# Patient Record
Sex: FEMALE | Race: WHITE | NOT HISPANIC OR LATINO | Employment: PART TIME | URBAN - NONMETROPOLITAN AREA
[De-identification: names, ages, dates, MRNs, and addresses within clinical notes are randomized per-mention and may not be internally consistent; named-entity substitution may affect disease eponyms.]

---
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
## 2024-05-27 ENCOUNTER — APPOINTMENT (OUTPATIENT)
Dept: RADIOLOGY | Facility: MEDICAL CENTER | Age: 19
End: 2024-05-27
Payer: COMMERCIAL

## 2024-05-27 ENCOUNTER — TELEPHONE (OUTPATIENT)
Dept: URGENT CARE | Facility: MEDICAL CENTER | Age: 19
End: 2024-05-27

## 2024-05-27 ENCOUNTER — OFFICE VISIT (OUTPATIENT)
Dept: URGENT CARE | Facility: MEDICAL CENTER | Age: 19
End: 2024-05-27
Payer: COMMERCIAL

## 2024-05-27 VITALS
OXYGEN SATURATION: 99 % | RESPIRATION RATE: 18 BRPM | HEART RATE: 101 BPM | WEIGHT: 140 LBS | HEIGHT: 67 IN | TEMPERATURE: 98.7 F | BODY MASS INDEX: 21.97 KG/M2

## 2024-05-27 DIAGNOSIS — S82.302A FRACTURE OF DISTAL END OF TIBIA WITH FIBULA, LEFT, CLOSED, INITIAL ENCOUNTER: Primary | ICD-10-CM
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
## 2024-05-27 DIAGNOSIS — S82.832A FRACTURE OF DISTAL END OF TIBIA WITH FIBULA, LEFT, CLOSED, INITIAL ENCOUNTER: Primary | ICD-10-CM

## 2024-05-27 DIAGNOSIS — S62.309A CLOSED FRACTURE OF HEAD OF METACARPAL: ICD-10-CM

## 2024-05-27 PROCEDURE — 29130 APPL FINGER SPLINT STATIC: CPT | Performed by: STUDENT IN AN ORGANIZED HEALTH CARE EDUCATION/TRAINING PROGRAM

## 2024-05-27 PROCEDURE — 73610 X-RAY EXAM OF ANKLE: CPT

## 2024-05-27 PROCEDURE — G0382 LEV 3 HOSP TYPE B ED VISIT: HCPCS | Performed by: STUDENT IN AN ORGANIZED HEALTH CARE EDUCATION/TRAINING PROGRAM

## 2024-05-27 PROCEDURE — 73110 X-RAY EXAM OF WRIST: CPT

## 2024-05-27 PROCEDURE — 73130 X-RAY EXAM OF HAND: CPT

## 2024-05-27 PROCEDURE — 73630 X-RAY EXAM OF FOOT: CPT

## 2024-05-27 NOTE — LETTER
May 27, 2024     Patient: Cyndi Nath   YOB: 2005   Date of Visit: 5/27/2024       To Whom It May Concern:    It is my medical opinion that Cyndi Nath may return to light duty immediately with the following restrictions: non weight bearing on the left lower leg and avoid use of left hand . Restrictions in place for 2 weeks maximum or till seen by specialist, whichever is earlier.    If you have any questions or concerns, please don't hesitate to call.         Sincerely,        Barbara Gatica,     CC: No Recipients

## 2024-05-27 NOTE — PROGRESS NOTES
[facility]        NAME: Cyndi Nath is a 19 y.o. female  : 2005    MRN: [id number]    Assessment and Plan   Fracture of distal end of tibia with fibula, left, closed, initial encounter [S82.302A, S82.832A]  1. Fracture of distal end of tibia with fibula, left, closed, initial encounter  XR foot 3+ vw left    XR ankle 3+ vw left    Ambulatory Referral to Orthopedic Surgery    Orthopedic injury treatment      2. Closed fracture of head of metacarpal  XR hand 3+ vw left    XR wrist 3+ vw left    Ambulatory Referral to Orthopedic Surgery    Orthopedic injury treatment        Foot and ankle x-ray-possible fracture of the lateral malleolus along the posterior aspect, final radiology read pending.  Hand and wrist x-ray-2 bone fragments noted over the third and fourth metacarpal heads, likely avulsion, final radiology read pending.  Placed splints for both suspected ankle and hand fractures.  Provided crutches for nonweightbearing of left lower extremity.  Recommend RICE and NSAIDs.  Orthopedic consult scheduled.      Patient Instructions     See wrap up for details  Proceed to  ER if symptoms worsen.    Chief Complaint     Chief Complaint   Patient presents with    Ankle Pain     Left ankle pain after falling when playing basket ball last night     Hand Pain     Left hand a wrist pain that started last night          History of Present Illness     Ankle Pain     Hand Pain   Pertinent negatives include no chest pain.     P/w injury to the left hand and left ankle.  Was playing basketball yesterday when she twisted her ankle inwards and fell down and broke her fall with her left arm with the elbow flexed in neutral position and fell on the dorsal aspect of the left hand.  Reports there was some pain in the left wrist initially but that has since resolved, at this time only has pain over the third and fourth knuckles with some swelling.  Has been using cool compress with some relief.  Reports some  limitation range of motion.  Reports swelling and pain over the lateral ankle, has been able to weight-bear.    Review of Systems   Review of Systems   Constitutional:  Negative for chills and fever.   HENT:  Negative for ear pain and sore throat.    Eyes:  Negative for pain and visual disturbance.   Respiratory:  Negative for cough, chest tightness and shortness of breath.    Cardiovascular:  Negative for chest pain and palpitations.   Gastrointestinal:  Negative for abdominal pain, constipation, diarrhea, nausea and vomiting.   Genitourinary:  Negative for dysuria, hematuria and menstrual problem.   Musculoskeletal:  Positive for arthralgias and joint swelling. Negative for back pain.   Skin:  Negative for color change and rash.   Neurological:  Negative for seizures and syncope.   Psychiatric/Behavioral:  Negative for dysphoric mood and suicidal ideas.    All other systems reviewed and are negative.    Current Medications       Current Outpatient Medications:     ibuprofen (MOTRIN) 400 mg tablet, Take by mouth every 6 (six) hours as needed for mild pain, Disp: , Rfl:     Lessina 0.1-20 MG-MCG per tablet, take 1 tablet by mouth once daily for FIRST DAY OF NEXT MENSES, Disp: , Rfl:     sodium chloride (OCEAN) 0.65 % nasal spray, 1 spray into each nostril as needed for rhinitis (Patient not taking: Reported on 8/24/2023), Disp: 30 mL, Rfl: 0    Current Allergies     Allergies as of 05/27/2024 - Reviewed 05/27/2024   Allergen Reaction Noted    Adhesive [medical tape] Rash 09/14/2018    Caffeine - food allergy Palpitations 09/14/2018    Ceftin [cefuroxime] Palpitations 09/14/2018    Penicillins Rash 09/14/2018            The following portions of the patient's history were reviewed and updated as appropriate: allergies, current medications, past family history, past medical history, past social history, past surgical history and problem list.     Past Medical History:   Diagnosis Date    Chronic tonsillitis      "COVID-19     2021    Enlarged tonsils     Heart valve disorder     Pt has a shortened OH interval per pt    Hyperlipidemia     Tonsil stone        Past Surgical History:   Procedure Laterality Date    MOUTH SURGERY      TONSILECTOMY AND ADNOIDECTOMY N/A 6/8/2023    Procedure: TONSILLECTOMY & ADENOIDECTOMY;  Surgeon: David Abbasi MD;  Location: [facility] OR;  Service: ENT       Family History   Problem Relation Age of Onset    Cancer Mother     Hypertension Father     Hyperlipidemia Father          Medications have been verified.        Objective   Pulse 101   Temp 98.7 °F (37.1 °C)   Resp 18   Ht 5' 7\" (1.702 m)   Wt 63.5 kg (140 lb)   SpO2 99%   BMI 21.93 kg/m²        Physical Exam     Physical Exam  Constitutional:       General: She is not in acute distress.     Appearance: Normal appearance.   HENT:      Head: Normocephalic and atraumatic.   Eyes:      Extraocular Movements: Extraocular movements intact.      Conjunctiva/sclera: Conjunctivae normal.   Cardiovascular:      Rate and Rhythm: Normal rate.   Pulmonary:      Effort: Pulmonary effort is normal. No respiratory distress.   Musculoskeletal:      Left hand: Swelling, tenderness and bony tenderness present. No deformity or lacerations. Decreased range of motion. Decreased strength. Normal sensation. There is no disruption of two-point discrimination. Normal capillary refill. Normal pulse.        Hands:       Left ankle: Swelling present. No deformity, ecchymosis or lacerations. Tenderness present over the lateral malleolus. No medial malleolus, ATF ligament, AITF ligament, CF ligament, posterior TF ligament, base of 5th metatarsal or proximal fibula tenderness. Decreased range of motion. Anterior drawer test negative. Normal pulse.      Left Achilles Tendon: Normal.      Left foot: Normal range of motion and normal capillary refill. Laceration present. No swelling, deformity, bunion, foot drop, tenderness or bony tenderness. Normal pulse.      " Comments: Left hand tenderness to palpation over the distal portions of the third and fourth metacarpal bones along the dorsal aspect with mild associated swelling.  Limitation in  strength testing as patient unable to form a tight fist due to reproduction of pain.  Also reproduction of pain noted with resisted flexion and extension of the fourth and fifth digits.    Left ankle and foot-tenderness to palpation of the lateral malleolus along with significant swelling.  Abrasions noted over the lateral malleolus, fifth metatarsal base and fifth MTP joint with no tenderness to palpation.  Limitation in strength testing in ankle dorsiflexion and plantarflexion and inversion and eversion.   Skin:     General: Skin is warm and dry.   Neurological:      General: No focal deficit present.      Mental Status: She is alert and oriented to person, place, and time.   Psychiatric:         Mood and Affect: Mood normal.         Behavior: Behavior normal.       Orthopedic injury treatment    Date/Time: 5/27/2024 1:30 PM    Performed by: Barbara Gatica DO  Authorized by: Barbara Gatica DO    Patient Location:  [facility] Protocol:  Consent: Verbal consent obtained.  Risks and benefits: risks, benefits and alternatives were discussed  Consent given by: patient  Required items: required blood products, implants, devices, and special equipment available  Patient identity confirmed: verbally with patient    Injury location:  Ankle  Location details:  Left ankle  Injury type:  Fracture  Fracture type: lateral malleolus    Fracture type: lateral malleolus    Neurovascular status: Neurovascularly intact    Distal perfusion: normal    Neurological function: normal    Range of motion: reduced    Manipulation performed?: No    Immobilization:  Splint  Splint type:  Short leg (Cam boot)  Neurovascular status: Neurovascularly intact    Distal perfusion: normal    Neurological function: normal    Range of motion: unchanged     Patient tolerance:  Patient tolerated the procedure well with no immediate complications  Orthopedic injury treatment    Date/Time: 5/27/2024 1:30 PM    Performed by: Barbara Gatica DO  Authorized by: Barbara Gatica DO    Patient Location:  [facility] Protocol:  Consent: Verbal consent obtained.  Risks and benefits: risks, benefits and alternatives were discussed  Consent given by: patient  Required items: required blood products, implants, devices, and special equipment available  Patient identity confirmed: verbally with patient    Injury location:  Hand  Location details:  Left hand  Injury type:  Fracture  Fracture type: third metacarpal and fourth metacarpal    Neurovascular status: Neurovascularly intact    Distal perfusion: normal    Neurological function: normal    Range of motion: normal    Manipulation performed?: No    Immobilization:  Splint  Splint type:  Finger splint, static  Neurovascular status: Neurovascularly intact    Distal perfusion: normal    Neurological function: normal    Range of motion: unchanged    Patient tolerance:  Patient tolerated the procedure well with no immediate complications   Placed to straight aluminum finger splints on the third and fourth digits on the volar aspect and used an Ace wrap to secure in place to limit flexion and extension of the third and fourth digits.

## 2024-05-31 ENCOUNTER — OFFICE VISIT (OUTPATIENT)
Dept: OBGYN CLINIC | Facility: CLINIC | Age: 19
End: 2024-05-31

## 2024-05-31 VITALS
OXYGEN SATURATION: 99 % | HEART RATE: 71 BPM | HEIGHT: 67 IN | DIASTOLIC BLOOD PRESSURE: 78 MMHG | BODY MASS INDEX: 22.13 KG/M2 | TEMPERATURE: 98.3 F | SYSTOLIC BLOOD PRESSURE: 122 MMHG | WEIGHT: 141 LBS

## 2024-05-31 DIAGNOSIS — M79.642 PAIN IN LEFT HAND: ICD-10-CM

## 2024-05-31 DIAGNOSIS — S93.402A SPRAIN OF UNSPECIFIED LIGAMENT OF LEFT ANKLE, INITIAL ENCOUNTER: Primary | ICD-10-CM

## 2024-05-31 DIAGNOSIS — M25.572 PAIN, JOINT, ANKLE AND FOOT, LEFT: ICD-10-CM

## 2024-05-31 PROCEDURE — 99204 OFFICE O/P NEW MOD 45 MIN: CPT | Performed by: ORTHOPAEDIC SURGERY

## 2024-05-31 NOTE — PROGRESS NOTES
ASSESSMENT/PLAN:    Diagnoses and all orders for this visit:    Sprain of unspecified ligament of left ankle, initial encounter    Pain, joint, ankle and foot, left  -     Ambulatory Referral to Orthopedic Surgery    Pain in left hand  -     Ambulatory Referral to Orthopedic Surgery        Plan: Treatment was discussed.  I would recommend continuing the cam boot which may be removed for sleeping and for cleansing but should remain in place at all other times.  I will see her in 2 weeks for reevaluation.  She did not feel the need to attend physical therapy at this time.  If she were to change her mind, the office should be contacted and a prescription can be provided.  I have encouraged her to contact me if any questions or concerns arise.  In regards to her left hand, she is doing much better at this time and immobilization was not felt to be necessary as she has already taken the brace off and has not worn it for the last few days.  She will be reevaluated at her follow-up in 2 weeks    Return in about 2 weeks (around 6/14/2024).      _____________________________________________________  CHIEF COMPLAINT:  Chief Complaint   Patient presents with    Left Hand - Pain    Left Foot - Pain         SUBJECTIVE:  Cyndi Nath is a 19 y.o. year old right-handed female who presents for evaluation of her left hand and left ankle injuries.  She was playing basketball with some friends on 5/26/2024 and was knocked over inadvertently twisting her left ankle and then placing her left arm out to stop her fall and then landing on her hand.  She was seen in the emergency room at [facility], x-rays were obtained and she was placed into a cam boot for her left lower extremity injury and a brace for her left upper extremity.  She continues to wear the cam boot but stopped wearing the brace and did not bring this with her.  She states her left hand feels good with only some minor pain.  Her left ankle is somewhat  improved but continues to bother her with pain located primarily laterally.  She denies any paresthesias in either extremity and denies any history of prior injuries to either extremity.  She has been able to ambulate in the boot without the use of crutches.    PAST MEDICAL HISTORY:  Past Medical History:   Diagnosis Date    Chronic tonsillitis     COVID-19 2021    Enlarged tonsils     Heart valve disorder     Pt has a shortened CA interval per pt    Hyperlipidemia     Tonsil stone        PAST SURGICAL HISTORY:  Past Surgical History:   Procedure Laterality Date    MOUTH SURGERY      TONSILECTOMY AND ADNOIDECTOMY N/A 6/8/2023    Procedure: TONSILLECTOMY & ADENOIDECTOMY;  Surgeon: David Abbasi MD;  Location: [facility] OR;  Service: ENT       FAMILY HISTORY:  Family History   Problem Relation Age of Onset    Cancer Mother     Hypertension Father     Hyperlipidemia Father        SOCIAL HISTORY:  Social History     Tobacco Use    Smoking status: Never    Smokeless tobacco: Never   Vaping Use    Vaping status: Never Used   Substance Use Topics    Alcohol use: Never    Drug use: Never       MEDICATIONS:    Current Outpatient Medications:     ibuprofen (MOTRIN) 400 mg tablet, Take by mouth every 6 (six) hours as needed for mild pain, Disp: , Rfl:     Lessina 0.1-20 MG-MCG per tablet, take 1 tablet by mouth once daily for FIRST DAY OF NEXT MENSES, Disp: , Rfl:     sodium chloride (OCEAN) 0.65 % nasal spray, 1 spray into each nostril as needed for rhinitis (Patient not taking: Reported on 8/24/2023), Disp: 30 mL, Rfl: 0    ALLERGIES:  Allergies   Allergen Reactions    Adhesive [Medical Tape] Rash    Caffeine - Food Allergy Palpitations    Ceftin [Cefuroxime] Palpitations    Penicillins Rash       Review of systems:   Constitutional: Negative for fatigue, fever or loss of apetite.   HENT: Negative.    Respiratory: Negative for shortness of breath, dyspnea.    Cardiovascular: Negative for chest pain/tightness.  "  Gastrointestinal: Negative for abdominal pain, N/V.   Endocrine: Negative for cold/heat intolerance, unexplained weight loss/gain.   Genitourinary: Negative for flank pain, dysuria, hematuria.   Musculoskeletal: Positive as in the HPI  Skin: Negative for rash.    Neurological: Negative  Psychiatric/Behavioral: Negative for agitation.  _____________________________________________________  PHYSICAL EXAMINATION:    Blood pressure 122/78, pulse 71, temperature 98.3 °F (36.8 °C), temperature source Temporal, height 5' 7\" (1.702 m), weight 64 kg (141 lb), SpO2 99%.    General: well developed and well nourished, alert, oriented times 3, and appears comfortable  Psychiatric: Normal  HEENT: Benign  Cardiovascular: Regular    Pulmonary: No wheezing or stridor  Abdomen: Soft, Nontender  Skin: No masses, erythema, lacerations, fluctation, ulcerations  Neurovascular: Motor and sensory exams are grossly intact and pulses palpable.    MUSCULOSKELETAL EXAMINATION:    The left hand exam demonstrates no swelling, ecchymosis or deformity.  She has excellent range of motion and does complain of some mild pain in the area of the MCP joints of the long finger and ring finger.  She did have some tenderness to palpation in the area of the MCP joint of the ring finger but no tenderness at the remaining MCPs throughout the left hand.  The phalanges are nontender.  The metacarpals more proximal are nontender.  She has excellent range of motion of all fingers and the thumb without rotational or angular malalignment noted.    The left ankle exam demonstrated the boot in place.  This was removed without difficulty.  She has tenderness over the lateral ankle ligaments as well as the deltoid ligament as well as tenderness over the distal fibula and tibia.  Tenderness is greater over the ligamentous structures.  She has good range of motion but does complain of pain.  There are healing abrasions noted about the lateral foot.  She has no " tenderness throughout the foot.  The remainder of the left lower extremity exam is benign excluding an abrasion over the anterior knee.      _____________________________________________________  STUDIES REVIEWED:  X-rays of the left hand and wrist as well as the left ankle and foot were reviewed.  I see no evidence of acute bony injuries.  There are small ossifications noted on the ulnar aspect of the distal third and fourth metacarpals which do not appear to be acute in nature.    The x-ray reports were reviewed.    The ER note was reviewed.      Cipriano Govea

## 2024-06-19 ENCOUNTER — OFFICE VISIT (OUTPATIENT)
Dept: OBGYN CLINIC | Facility: CLINIC | Age: 19
End: 2024-06-19
Payer: COMMERCIAL

## 2024-06-19 VITALS
WEIGHT: 141 LBS | HEIGHT: 67 IN | OXYGEN SATURATION: 100 % | TEMPERATURE: 97.6 F | SYSTOLIC BLOOD PRESSURE: 118 MMHG | DIASTOLIC BLOOD PRESSURE: 72 MMHG | BODY MASS INDEX: 22.13 KG/M2 | HEART RATE: 65 BPM

## 2024-06-19 DIAGNOSIS — M79.642 PAIN IN LEFT HAND: ICD-10-CM

## 2024-06-19 DIAGNOSIS — S93.402D SPRAIN OF UNSPECIFIED LIGAMENT OF LEFT ANKLE, SUBSEQUENT ENCOUNTER: Primary | ICD-10-CM

## 2024-06-19 PROCEDURE — 99213 OFFICE O/P EST LOW 20 MIN: CPT | Performed by: ORTHOPAEDIC SURGERY

## 2024-06-19 NOTE — PROGRESS NOTES
ASSESSMENT/PLAN:    Diagnoses and all orders for this visit:    Sprain of unspecified ligament of left ankle, subsequent encounter  -     Ambulatory referral to Physical Therapy; Future  -     Ankle Cude ankle/Ankle Brace    Pain in left hand        Plan: I would recommend follow-up in 3 weeks.  I have transitioned her to an ankle brace and have recommended physical therapy.  A prescription was placed in her chart.  She is to contact me if questions or concerns arise.    Return in about 3 weeks (around 7/10/2024).  _____________________________________________________  CHIEF COMPLAINT:  Chief Complaint   Patient presents with    Follow-up     SUBJECTIVE:  Cyndi Nath is a 19 y.o. year old female who presents for follow up of her left ankle and left small finger injuries.  She notes improvement in her left ankle but does continue to experience some pain.  She admits that while away at the beach last week she did not wear the boot on the beach.  She would place it back on once she was off the beach.  She notes that she has difficulty fully extending her small finger.  However, she notes that there has been improvement since the initial injury..      PAST MEDICAL HISTORY:  Past Medical History:   Diagnosis Date    Chronic tonsillitis     COVID-19 2021    Enlarged tonsils     Heart valve disorder     Pt has a shortened CO interval per pt    Hyperlipidemia     Tonsil stone      PAST SURGICAL HISTORY:  Past Surgical History:   Procedure Laterality Date    MOUTH SURGERY      TONSILECTOMY AND ADNOIDECTOMY N/A 6/8/2023    Procedure: TONSILLECTOMY & ADENOIDECTOMY;  Surgeon: David Abbasi MD;  Location: [facility] OR;  Service: ENT     FAMILY HISTORY:  Family History   Problem Relation Age of Onset    Cancer Mother     Hypertension Father     Hyperlipidemia Father      SOCIAL HISTORY:  Social History     Tobacco Use    Smoking status: Never    Smokeless tobacco: Never   Vaping Use    Vaping status: Never Used    Substance Use Topics    Alcohol use: Never    Drug use: Never     MEDICATIONS:    Current Outpatient Medications:     ibuprofen (MOTRIN) 400 mg tablet, Take by mouth every 6 (six) hours as needed for mild pain, Disp: , Rfl:     Lessina 0.1-20 MG-MCG per tablet, take 1 tablet by mouth once daily for FIRST DAY OF NEXT MENSES, Disp: , Rfl:     sodium chloride (OCEAN) 0.65 % nasal spray, 1 spray into each nostril as needed for rhinitis (Patient not taking: Reported on 8/24/2023), Disp: 30 mL, Rfl: 0    ALLERGIES:  Allergies   Allergen Reactions    Adhesive [Medical Tape] Rash    Caffeine - Food Allergy Palpitations    Ceftin [Cefuroxime] Palpitations    Penicillins Rash     REVIEW OF SYSTEMS:  Pertinent items are noted in HPI.  A comprehensive review of systems was negative.  _____________________________________________________  PHYSICAL EXAMINATION:  General: well developed and well nourished, alert, and appears comfortable  Psychiatric: Normal  HEENT:  Normocephalic, atraumatic  Cardiovascular:  Regular  Pulmonary: No wheezing or stridor  Skin: No masses, erthema, lacerations, fluctation, ulcerations  Neurovascular: Motor and sensory exams are intact and pulses palpable.    MUSCULOSKELETAL EXAMINATION:    Left ankle exam demonstrates tenderness over the posterior talofibular ligament.  The calcaneofibular ligament and anterior talofibular ligament are nontender.  Deltoid ligament is nontender.  She does complain of some mild tenderness over the medial malleolus.  She has no instability with inversion or eversion stress but does complain of pain with inversion stress and plantarflexion.  Syndesmotic compression is negative.  Anterior drawer sign is negative.  There is no significant swelling.    Left hand exam demonstrates nearly full extension of the small finger lacking only a few degrees of terminal extension.  She has no tenderness to palpation throughout the small finger and no complaints during resisted  extension of the MCP, PIP and DIP joints.  The remainder of the left hand exam is benign.      Cipriano Govae

## 2024-12-17 ENCOUNTER — OFFICE VISIT (OUTPATIENT)
Dept: URGENT CARE | Facility: MEDICAL CENTER | Age: 19
End: 2024-12-17
Payer: COMMERCIAL

## 2024-12-17 VITALS
RESPIRATION RATE: 20 BRPM | DIASTOLIC BLOOD PRESSURE: 80 MMHG | BODY MASS INDEX: 22.08 KG/M2 | SYSTOLIC BLOOD PRESSURE: 120 MMHG | TEMPERATURE: 97.6 F | WEIGHT: 141 LBS | HEART RATE: 86 BPM | OXYGEN SATURATION: 99 %

## 2024-12-17 DIAGNOSIS — N30.01 ACUTE CYSTITIS WITH HEMATURIA: Primary | ICD-10-CM

## 2024-12-17 LAB
SL AMB  POCT GLUCOSE, UA: ABNORMAL
SL AMB LEUKOCYTE ESTERASE,UA: ABNORMAL
SL AMB POCT BILIRUBIN,UA: ABNORMAL
SL AMB POCT BLOOD,UA: ABNORMAL
SL AMB POCT CLARITY,UA: ABNORMAL
SL AMB POCT COLOR,UA: YELLOW
SL AMB POCT KETONES,UA: ABNORMAL
SL AMB POCT NITRITE,UA: ABNORMAL
SL AMB POCT PH,UA: 5
SL AMB POCT SPECIFIC GRAVITY,UA: 1.01
SL AMB POCT URINE HCG: NORMAL
SL AMB POCT URINE PROTEIN: 100
SL AMB POCT UROBILINOGEN: 0.2

## 2024-12-17 PROCEDURE — 87147 CULTURE TYPE IMMUNOLOGIC: CPT

## 2024-12-17 PROCEDURE — 81002 URINALYSIS NONAUTO W/O SCOPE: CPT

## 2024-12-17 PROCEDURE — 87086 URINE CULTURE/COLONY COUNT: CPT

## 2024-12-17 PROCEDURE — G0382 LEV 3 HOSP TYPE B ED VISIT: HCPCS

## 2024-12-17 PROCEDURE — 81025 URINE PREGNANCY TEST: CPT

## 2024-12-17 RX ORDER — NITROFURANTOIN 25; 75 MG/1; MG/1
100 CAPSULE ORAL 2 TIMES DAILY
Qty: 10 CAPSULE | Refills: 0 | Status: SHIPPED | OUTPATIENT
Start: 2024-12-17 | End: 2024-12-22

## 2024-12-17 NOTE — PROGRESS NOTES
[facility]        NAME: Cyndi Nath is a 19 y.o. female  : 2005    MRN: [id number]  DATE: 2024  TIME: 2:14 PM    Assessment and Plan   Acute cystitis with hematuria [N30.01]  1. Acute cystitis with hematuria  POCT urine dip    Urine culture    POCT urine HCG    nitrofurantoin (MACROBID) 100 mg capsule        POCT Urine Dip:    LEUKOCYTE ESTERASE,UA: large  NITRITE,UA: neg  SL AMB POCT UROBILINOGEN: 0.2  POCT URINE PROTEIN: 100   PH,UA: 5  BLOOD,UA: large  SPECIFIC GRAVITY,UA: 1.015  KETONES,UA: neg  BILIRUBIN,UA: neg  GLUCOSE, UA: neg   COLOR,UA: yellow  CLARITY,UA: cloudy     POCT Urine HCG: Negative    Urine culture pending. Will treat at this time based upon patient symptoms and POCT urine dip.    Patient Instructions   Take antibiotics as prescribed, being sure to complete full course of therapy even if you feel better!    Eat yogurt with live and active cultures and/or take a probiotic at least 3 hours before or after antibiotic dose. Monitor stool for diarrhea and/or blood. If this occurs, contact primary care doctor ASAP.       Increase fluid intake.   Tylenol/ibuprofen for any pain/fever.     Cranberry supplements  Urinate within 5 minutes following intercourse  Follow up with PCP in 3-5 days.  Proceed to  ER if symptoms worsen.    If tests have been performed at [facility], our office will contact you with results if changes need to be made to the care plan discussed with you at the visit.  You can review your full results on [facility].    Chief Complaint     Chief Complaint   Patient presents with    Possible UTI     Dysuria, frequent, pelvic pressure. No back pain, fever or chills. SX started . Did increase fluids. HX of frequent UTI's         History of Present Illness       Urinary Tract Infection   This is a new problem. The current episode started in the past 7 days (x2 days). The problem occurs every urination. The problem has been gradually  worsening. The quality of the pain is described as burning. The pain is at a severity of 5/10. The pain is moderate. There has been no fever. Associated symptoms include frequency and urgency. Pertinent negatives include no chills, discharge, flank pain, hematuria, hesitancy, nausea, possible pregnancy or vomiting. She has tried nothing for the symptoms. Her past medical history is significant for recurrent UTIs.       Review of Systems   Review of Systems   Constitutional:  Negative for chills and fever.   Respiratory:  Negative for shortness of breath.    Cardiovascular:  Negative for chest pain.   Gastrointestinal:  Negative for abdominal distention, abdominal pain, nausea and vomiting.   Genitourinary:  Positive for dysuria, frequency, pelvic pain and urgency. Negative for decreased urine volume, difficulty urinating, flank pain, hematuria, hesitancy, vaginal bleeding and vaginal discharge.   Musculoskeletal:  Negative for back pain, gait problem and myalgias.   Skin:  Negative for color change, pallor and rash.         Current Medications       Current Outpatient Medications:     ibuprofen (MOTRIN) 400 mg tablet, Take by mouth every 6 (six) hours as needed for mild pain, Disp: , Rfl:     nitrofurantoin (MACROBID) 100 mg capsule, Take 1 capsule (100 mg total) by mouth 2 (two) times a day for 5 days, Disp: 10 capsule, Rfl: 0    Lessina 0.1-20 MG-MCG per tablet, take 1 tablet by mouth once daily for FIRST DAY OF NEXT MENSES (Patient not taking: Reported on 12/17/2024), Disp: , Rfl:     sodium chloride (OCEAN) 0.65 % nasal spray, 1 spray into each nostril as needed for rhinitis (Patient not taking: Reported on 8/24/2023), Disp: 30 mL, Rfl: 0    Current Allergies     Allergies as of 12/17/2024 - Reviewed 12/17/2024   Allergen Reaction Noted    Adhesive [medical tape] Rash 09/14/2018    Caffeine - food allergy Palpitations 09/14/2018    Ceftin [cefuroxime] Palpitations 09/14/2018    Penicillins Rash 09/14/2018             The following portions of the patient's history were reviewed and updated as appropriate: allergies, current medications, past family history, past medical history, past social history, past surgical history and problem list.     Past Medical History:   Diagnosis Date    Chronic tonsillitis     COVID-19     2021    Enlarged tonsils     Heart valve disorder     Pt has a shortened SD interval per pt    Hyperlipidemia     Tonsil stone        Past Surgical History:   Procedure Laterality Date    MOUTH SURGERY      TONSILECTOMY AND ADNOIDECTOMY N/A 6/8/2023    Procedure: TONSILLECTOMY & ADENOIDECTOMY;  Surgeon: David Abbasi MD;  Location: [facility] OR;  Service: ENT       Family History   Problem Relation Age of Onset    Cancer Mother     Hypertension Father     Hyperlipidemia Father          Medications have been verified.        Objective   /80   Pulse 86   Temp 97.6 °F (36.4 °C)   Resp 20   Wt 64 kg (141 lb)   LMP 12/10/2024   SpO2 99%   BMI 22.08 kg/m²   Patient's last menstrual period was 12/10/2024.       Physical Exam     Physical Exam  Vitals and nursing note reviewed.   Constitutional:       Appearance: Normal appearance. She is not ill-appearing.   HENT:      Head: Normocephalic and atraumatic.      Right Ear: External ear normal.      Left Ear: External ear normal.      Nose: Nose normal.      Mouth/Throat:      Mouth: Mucous membranes are moist.      Pharynx: Oropharynx is clear.   Eyes:      Extraocular Movements: Extraocular movements intact.      Pupils: Pupils are equal, round, and reactive to light.   Cardiovascular:      Rate and Rhythm: Normal rate and regular rhythm.      Pulses: Normal pulses.      Heart sounds: Normal heart sounds.   Pulmonary:      Effort: Pulmonary effort is normal. No respiratory distress.      Breath sounds: Normal breath sounds. No stridor. No wheezing, rhonchi or rales.   Chest:      Chest wall: No tenderness.   Abdominal:      General: Abdomen is flat.  Bowel sounds are normal. There is no distension.      Palpations: Abdomen is soft. There is no mass.      Tenderness: There is abdominal tenderness in the suprapubic area. There is no right CVA tenderness or left CVA tenderness.   Musculoskeletal:         General: Normal range of motion.      Cervical back: Normal range of motion and neck supple.   Skin:     General: Skin is warm and dry.      Capillary Refill: Capillary refill takes less than 2 seconds.      Coloration: Skin is not pale.      Findings: No erythema or rash.   Neurological:      General: No focal deficit present.      Mental Status: She is alert. Mental status is at baseline.   Psychiatric:         Mood and Affect: Mood normal.         Behavior: Behavior normal.         Thought Content: Thought content normal.         Judgment: Judgment normal.

## 2024-12-17 NOTE — PATIENT INSTRUCTIONS
Take antibiotics as prescribed, being sure to complete full course of therapy even if you feel better!    Eat yogurt with live and active cultures and/or take a probiotic at least 3 hours before or after antibiotic dose. Monitor stool for diarrhea and/or blood. If this occurs, contact primary care doctor ASAP.       Increase fluid intake.   Tylenol/ibuprofen for any pain/fever.     Cranberry supplements  Urinate within 5 minutes following intercourse  Follow up with PCP in 3-5 days.  Proceed to  ER if symptoms worsen.    If tests have been performed at [facility], our office will contact you with results if changes need to be made to the care plan discussed with you at the visit.  You can review your full results on [facility] MyChart.

## 2024-12-18 LAB
BACTERIA UR CULT: ABNORMAL
BACTERIA UR CULT: ABNORMAL

## 2024-12-20 ENCOUNTER — RESULTS FOLLOW-UP (OUTPATIENT)
Dept: URGENT CARE | Facility: MEDICAL CENTER | Age: 19
End: 2024-12-20

## (undated) DEVICE — AIRLIFE™ TRI-FLO™ SUCTION CATHETER WITH CONTROL PORT: Brand: AIRLIFE™

## (undated) DEVICE — CATH URET 12FR RED RUBBER

## (undated) DEVICE — GLOVE SRG BIOGEL 7.5

## (undated) DEVICE — SYRINGE BULB 2 OZ

## (undated) DEVICE — ANTI-FOG SOLUTION WITH FOAM PAD: Brand: DEVON

## (undated) DEVICE — UTILITY MARKER,BLACK WITH LABELS: Brand: DEVON

## (undated) DEVICE — WAND COBLATION  PROCISE XP TONSIL

## (undated) DEVICE — SPONGE,TONSIL,DBL STRNG,XRAY,SM,7/8",ST: Brand: MEDLINE INDUSTRIES, INC.

## (undated) DEVICE — SCD SEQUENTIAL COMPRESSION COMFORT SLEEVE MEDIUM KNEE LENGTH: Brand: KENDALL SCD

## (undated) DEVICE — STERILE BETHLEHEM T AND A PACK: Brand: CARDINAL HEALTH